# Patient Record
Sex: FEMALE | Race: WHITE | NOT HISPANIC OR LATINO | Employment: FULL TIME | URBAN - NONMETROPOLITAN AREA
[De-identification: names, ages, dates, MRNs, and addresses within clinical notes are randomized per-mention and may not be internally consistent; named-entity substitution may affect disease eponyms.]

---

## 2018-11-29 ENCOUNTER — TRANSCRIBE ORDERS (OUTPATIENT)
Dept: MAMMOGRAPHY | Facility: HOSPITAL | Age: 68
End: 2018-11-29

## 2018-11-29 DIAGNOSIS — Z12.39 BREAST CANCER SCREENING: Primary | ICD-10-CM

## 2019-01-16 ENCOUNTER — APPOINTMENT (OUTPATIENT)
Dept: MAMMOGRAPHY | Facility: HOSPITAL | Age: 69
End: 2019-01-16
Attending: INTERNAL MEDICINE

## 2019-01-30 ENCOUNTER — HOSPITAL ENCOUNTER (OUTPATIENT)
Dept: MAMMOGRAPHY | Facility: HOSPITAL | Age: 69
Discharge: HOME | End: 2019-01-30
Attending: INTERNAL MEDICINE | Admitting: INTERNAL MEDICINE

## 2019-01-30 DIAGNOSIS — Z12.39 BREAST CANCER SCREENING: ICD-10-CM

## 2019-01-30 PROCEDURE — 77067 SCR MAMMO BI INCL CAD: CPT

## 2019-01-30 PROCEDURE — 77063 BREAST TOMOSYNTHESIS BI: CPT

## 2020-02-13 ENCOUNTER — OFFICE VISIT (OUTPATIENT)
Dept: OBSTETRICS AND GYNECOLOGY | Facility: CLINIC | Age: 70
End: 2020-02-13

## 2020-02-13 VITALS
DIASTOLIC BLOOD PRESSURE: 78 MMHG | BODY MASS INDEX: 28.89 KG/M2 | WEIGHT: 157 LBS | SYSTOLIC BLOOD PRESSURE: 122 MMHG | HEIGHT: 62 IN

## 2020-02-13 DIAGNOSIS — Z12.31 ENCOUNTER FOR SCREENING MAMMOGRAM FOR MALIGNANT NEOPLASM OF BREAST: ICD-10-CM

## 2020-02-13 DIAGNOSIS — Z01.419 WELL WOMAN EXAM WITH ROUTINE GYNECOLOGICAL EXAM: Primary | ICD-10-CM

## 2020-02-13 DIAGNOSIS — Z12.39 SCREENING FOR MALIGNANT NEOPLASM OF BREAST: ICD-10-CM

## 2020-02-13 DIAGNOSIS — Z79.890 HORMONE REPLACEMENT THERAPY (HRT): ICD-10-CM

## 2020-02-13 PROCEDURE — G0101 CA SCREEN;PELVIC/BREAST EXAM: HCPCS | Performed by: OBSTETRICS & GYNECOLOGY

## 2020-02-13 RX ORDER — ESTRADIOL 0.04 MG/D
1 PATCH TRANSDERMAL WEEKLY
Qty: 8 PATCH | Refills: 6 | Status: SHIPPED | OUTPATIENT
Start: 2020-02-13

## 2020-02-13 RX ORDER — LEVOTHYROXINE SODIUM 0.07 MG/1
TABLET ORAL
COMMUNITY

## 2020-02-18 NOTE — PROGRESS NOTES
Subjective   Chief Complaint   Patient presents with   • Menopause     Hot flashes, very emotional, mood swings.     Brenda Mtz is a 70 y.o. year old  presenting to be seen for an annual well woman visit.    She stopped HRT roughly 4 weeks ago.  She has severe hot flashes and night sweats.  She reports mood instability.  She would like to restart HRT.  She underwent a hysterectomy in  and has been on estrogen since that time.    She denies sexual dysfunction. She denies urinary complaints.    OB Hx: 2 term C-sections  Contraception: Not applicable  Pap smear: Unsure, denies abnormals  Mammogram: 2019, denies abnormals  Colonoscopy: unsure  DEXA Scan: unsure    She denies nausea, emesis, fevers, chills, significant weight changes, hair/skin/nail changes, dysuria, urinary frequency, palpitations, chest pain, headaches, myalgia, dyspnea.     History  History reviewed. No pertinent past medical history., Past Surgical History:   Procedure Laterality Date   •  SECTION     • OOPHORECTOMY     • TOTAL ABDOMINAL HYSTERECTOMY WITH SALPINGO OOPHORECTOMY     , History reviewed. No pertinent family history., Social History     Tobacco Use   • Smoking status: Never Smoker   • Smokeless tobacco: Never Used   Substance Use Topics   • Alcohol use: Never     Frequency: Never   • Drug use: Never   ,   (Not in a hospital admission) and Allergies:  Azithromycin; Losartan; Levofloxacin; Lisinopril; Red dye; Sulfa antibiotics; Sulfamethoxazole-trimethoprim; and Penicillins    Current Outpatient Medications on File Prior to Visit   Medication Sig Dispense Refill   • levothyroxine (SYNTHROID, LEVOTHROID) 75 MCG tablet levothyroxine 75 mcg tablet       No current facility-administered medications on file prior to visit.        Social History    Tobacco Use      Smoking status: Never Smoker      Smokeless tobacco: Never Used      Review of Systems  Pertinent items are noted in HPI, all other systems were reviewed and  "negative       Objective   /78   Ht 156.2 cm (61.5\")   Wt 71.2 kg (157 lb)   BMI 29.18 kg/m²     Physical Exam:  General Appearance: alert, pleasant, appears stated age, interactive and cooperative  Head: normocephalic, without obvious abnormality and atraumatic  Eyes: lids and lashes normal and no icterus  Ears: ears appear intact with no abnormalities noted  Nose: nares normal, septum midline, mucosa normal and no drainage  Neck: suppple, trachea midline and no thyromegaly  Back: no kyphosis present, no scoliosis present and range of motion normal  Lungs: respirations regular, respirations even and respirations unlabored, clear to auscultation bilaterally   Heart: regular rhythm and normal rate, normal S1, S2, no murmur, gallop, or rubs and no click  Breasts: Examined in supine position  Symmetric without masses or skin dimpling  Nipples normal without inversion, lesions or discharge  There are no palpable axillary nodes  Abdomen: no masses, no hepatomegaly, no splenomegaly, soft non-tender, no guarding and no rebound tenderness  Extremities: moves extremities well, no edema, no cyanosis and no redness  Skin: no bleeding, bruising or rash and no lesions noted  Lymph Nodes: no palpable adenopathy  Neurologic: Cranial Nerves cranial nerves 2 - 12 grossly intact, Speech normal content and execusion, Coordination normal  Psych: normal mood and affect, oriented to person, time and place, thought content organized and appropriate judgment    Pelvis:  Pelvic: Clinical staff was present for exam  External genitalia:  normal appearance of the external genitalia including Bartholin's and [place]'s glands.  :  urethral meatus normal;  Vagina:  atrophic mucosal changes are present;  Cervix:  absent.  Uterus:  absent.  Adnexa:  normal bimanual exam of the adnexa.  Rectal:  digital rectal exam not performed; anus visually normal appearing.       Assessment   Annual well woman exam with age appropriate " screening  Menopausal symptoms  Hormone replacement therapy     Plan    Orders Placed This Encounter   Procedures   • Mammo Screening Digital Tomosynthesis Bilateral With CAD     Standing Status:   Future     Standing Expiration Date:   8/11/2020     Order Specific Question:   Reason for Exam:     Answer:   SCREEN     Medications ordered: Climara patches    Procedures performed: none    - Mammogram: Ordered  - Pap screening guidelines reviewed; pap smear not indicated  - Yearly clinical breast and pelvic exams recommended regardless of pap recommendations  - Dexa scan: not indicated   - Colonoscopy: not indicated   - Healthy diet and exercise encouraged  - Calcium and Vitamin D requirements reviewed  - Contraception: N/A  - Screening: none  - Seat belt use encouraged  - We reviewed options to address her menopausal symptoms.  We discussed hormonal and nonhormonal therapies.  She would like to resume estrogen therapy.  Rx for Climara patches today.  Instructions reviewed.    Follow up for annual visits    Anish Carrillo MD  Obstetrics and Gynecology  [facility]

## 2020-12-02 ENCOUNTER — LAB (OUTPATIENT)
Dept: LAB | Facility: HOSPITAL | Age: 70
End: 2020-12-02

## 2020-12-02 ENCOUNTER — TRANSCRIBE ORDERS (OUTPATIENT)
Dept: LAB | Facility: HOSPITAL | Age: 70
End: 2020-12-02

## 2020-12-02 DIAGNOSIS — Z11.59 SPECIAL SCREENING EXAMINATION FOR UNSPECIFIED VIRAL DISEASE: ICD-10-CM

## 2020-12-02 DIAGNOSIS — Z11.59 SPECIAL SCREENING EXAMINATION FOR UNSPECIFIED VIRAL DISEASE: Primary | ICD-10-CM

## 2020-12-02 PROCEDURE — C9803 HOPD COVID-19 SPEC COLLECT: HCPCS

## 2020-12-02 PROCEDURE — U0004 COV-19 TEST NON-CDC HGH THRU: HCPCS

## 2020-12-03 LAB — SARS-COV-2 RNA RESP QL NAA+PROBE: NOT DETECTED

## 2022-07-12 ENCOUNTER — TRANSCRIBE ORDERS (OUTPATIENT)
Dept: ADMINISTRATIVE | Facility: HOSPITAL | Age: 72
End: 2022-07-12

## 2022-07-12 DIAGNOSIS — Z12.31 VISIT FOR SCREENING MAMMOGRAM: Primary | ICD-10-CM

## 2022-08-19 ENCOUNTER — HOSPITAL ENCOUNTER (OUTPATIENT)
Dept: MAMMOGRAPHY | Facility: HOSPITAL | Age: 72
Discharge: HOME | End: 2022-08-19
Admitting: NURSE PRACTITIONER

## 2022-08-19 DIAGNOSIS — Z12.31 VISIT FOR SCREENING MAMMOGRAM: ICD-10-CM

## 2022-08-19 PROCEDURE — 77063 BREAST TOMOSYNTHESIS BI: CPT

## 2022-08-19 PROCEDURE — 77067 SCR MAMMO BI INCL CAD: CPT

## 2022-09-27 ENCOUNTER — TRANSCRIBE ORDERS (OUTPATIENT)
Dept: ADMINISTRATIVE | Facility: HOSPITAL | Age: 72
End: 2022-09-27

## 2022-09-27 DIAGNOSIS — R92.8 ABNORMAL MAMMOGRAM: ICD-10-CM

## 2022-09-27 DIAGNOSIS — N63.0 BREAST NODULE: Primary | ICD-10-CM

## 2022-11-17 ENCOUNTER — HOSPITAL ENCOUNTER (OUTPATIENT)
Dept: MAMMOGRAPHY | Facility: HOSPITAL | Age: 72
Discharge: HOME | End: 2022-11-17

## 2022-11-17 ENCOUNTER — HOSPITAL ENCOUNTER (OUTPATIENT)
Dept: ULTRASOUND IMAGING | Facility: HOSPITAL | Age: 72
Discharge: HOME | End: 2022-11-17

## 2022-11-17 DIAGNOSIS — R92.8 ABNORMAL MAMMOGRAM: ICD-10-CM

## 2022-11-17 PROCEDURE — 76642 ULTRASOUND BREAST LIMITED: CPT

## 2022-11-17 PROCEDURE — G0279 TOMOSYNTHESIS, MAMMO: HCPCS

## 2022-11-17 PROCEDURE — 77065 DX MAMMO INCL CAD UNI: CPT

## 2023-02-17 ENCOUNTER — TRANSCRIBE ORDERS (OUTPATIENT)
Dept: GENERAL RADIOLOGY | Facility: HOSPITAL | Age: 73
End: 2023-02-17
Payer: MEDICARE

## 2023-02-17 ENCOUNTER — HOSPITAL ENCOUNTER (OUTPATIENT)
Dept: GENERAL RADIOLOGY | Facility: HOSPITAL | Age: 73
Discharge: HOME | End: 2023-02-17
Admitting: NURSE PRACTITIONER
Payer: MEDICARE

## 2023-02-17 DIAGNOSIS — S89.91XA INJURY OF KNEE, RIGHT, INITIAL ENCOUNTER: Primary | ICD-10-CM

## 2023-02-17 DIAGNOSIS — S89.91XA INJURY OF KNEE, RIGHT, INITIAL ENCOUNTER: ICD-10-CM

## 2023-02-17 PROCEDURE — 73562 X-RAY EXAM OF KNEE 3: CPT

## 2023-05-25 ENCOUNTER — HOSPITAL ENCOUNTER (OUTPATIENT)
Dept: PHYSICAL THERAPY | Facility: HOSPITAL | Age: 73
Setting detail: THERAPIES SERIES
Discharge: HOME | End: 2023-05-25
Payer: MEDICARE

## 2023-05-25 PROCEDURE — 97110 THERAPEUTIC EXERCISES: CPT

## 2023-05-25 PROCEDURE — 97161 PT EVAL LOW COMPLEX 20 MIN: CPT

## 2023-05-25 ASSESSMENT — PAIN DESCRIPTION - LOCATION: LOCATION: BACK;KNEE

## 2023-05-25 ASSESSMENT — PAIN DESCRIPTION - ORIENTATION: ORIENTATION: RIGHT;POSTERIOR

## 2023-05-25 ASSESSMENT — PAIN SCALES - GENERAL: PAINLEVEL_OUTOF10: 5

## 2023-05-25 ASSESSMENT — PAIN DESCRIPTION - DESCRIPTORS: DESCRIPTORS: TINGLING;PINS AND NEEDLES

## 2023-05-25 ASSESSMENT — PAIN DESCRIPTION - PAIN TYPE: TYPE: ACUTE PAIN;NEUROPATHIC PAIN

## 2023-05-25 NOTE — PROGRESS NOTES
Right Reflexes             Left Quadriceps (L3-4):  Average/Normal  Left Gastrocnemius (S1):  Average/Normal Right Quadriceps (L3-4):  Average/Normal  Right Gastrocnemius (S1):  Average/Normal     Left AROM  Right AROM         AROM LLE (degrees)  L SLR: 0-50  L Hip External Rotation (0-45): 0-29  L Hip Internal Rotation (0-45): 0-24    AROM RLE (degrees)  R SLR: 0-55  R Hip External Rotation (0-45): 0-24  R Hip Internal Rotation (0-45): 0-28     Left PROM  Right PROM       WNL    WNL     Left Strength  Right Strength         Strength LLE  L Hip Flexion: 4+/5  L Hip Extension: 3+/5  L Hip ABduction: 3+/5  L Hip ADduction: 4/5  L Hip Internal Rotation: 4-/5  L Hip External Rotation: 4-/5  L Knee Flexion: 4+/5  L Knee Extension: 5/5  L Ankle Dorsiflexion: 5/5    Strength RLE  R Hip Flexion: 4/5  R Hip Extension: 3+/5  R Hip ABduction: 3-/5  R Hip ADduction: 4+/5  R Hip Internal Rotation: 4/5  R Hip External Rotation: 3+/5  R Knee Flexion: 3+/5  R Knee Extension: 5/5  R Ankle Dorsiflexion: 4+/5     Cervical Assessment    FHP         Thoracic Assessment     Kyphosis        Lumbar Assessment   AROM Lumbar Spine   Lumbar Spine AROM : Painful  Measured as: Degrees  Flexion: 0-60 5/10  Extension: 0-25 5/10  Lateral Flexion Right: 0-30 5/10  Lateral Flexion Left: 0-25 2/10  Right Rotation: 5/10  Left Rotation: 5/10       Trunk Strength      Poor TA recruitment      Muscle Length/Flexibility: Muscle Length LE  General Muscle Length - LE: Length assessed  90/90 SLR (Hamstring Tightness): R  L     Joint Mobility (if applicable):    Hypomobile lumbar segments     Special Tests:   Special Tests for Hip  Hip Special Tests Performed: Performed  Trendelenburg Sign: R (+), L (+)  Piriformis Tightness: R (+), L (+)  JOHN PAUL Test: L (+), R (+)  Anam Test: L (+), R (+)  Scour (OA, Labrum): R (+), L (+)      Additional Finding(s) (if applicable):    Back Index 56%  LEFS 28/80       ASSESSMENT     Impression: Assessment: Pt is a

## 2023-05-30 ENCOUNTER — HOSPITAL ENCOUNTER (OUTPATIENT)
Dept: PHYSICAL THERAPY | Facility: HOSPITAL | Age: 73
Setting detail: THERAPIES SERIES
Discharge: HOME | End: 2023-05-30
Payer: MEDICARE

## 2023-05-30 PROCEDURE — 97110 THERAPEUTIC EXERCISES: CPT

## 2023-05-30 PROCEDURE — 97140 MANUAL THERAPY 1/> REGIONS: CPT

## 2023-05-30 NOTE — FLOWSHEET NOTE
Physical Therapy Daily Treatment Note   Date:  2023    TIme In: 1346                    Time Out: [address]    Patient Name:  Miranda Pro    :  1950  MRN: [id number]    Restrictions/Precautions:    Pertinent Medical History:  Medical/Treatment Diagnosis Information:  Sciatica, right side [J44.83]     Insurance/Certification information:  Payor: [facility] MEDICARE / Plan: Vielka Diaz ESSENTIAL/PLUS / Product Type: *No Product type* /   Physician Information:  Taniya Gandara CNP  Plan of care signed (Y/N):    Visit# / total visits:     2 /    G-Code (if applicable):      Date / Visit # G-Code Applied:         Progress Note: []  Yes  [x]  No  Next due by: Visit #10       Pain level: 3/10    Subjective: Pt states she was really sore since last tx, she also reports taking some medications this morning that has helped pt her pain. She also states she did not complete her exercises this morning. Objective:  Observation:   Test measurements:    Palpation:    Exercises:  Exercise Resistance/Repetitions Date performed   Nustep lvl 6x6' 30   Seated ER/IR X20 B GTB  Next time   HS Curls  3x10 GTB R  Next time   Bridges  3x10 30   Abd press into ball  2x8 2\" 27   SKTC 3x30\" B 30   Piriformis 3x30\" B 30   Figure 4  3x30\" B 30   Sciatic nerve glide  3x10 R 30   Clamshells 3x10 2\" GTB B 30                         Other Therapeutic Activities:      Manual Treatments: SAD, STM to R lateral hip, IT band and piriformis x15'    Modalities:  MH R lateral hip x12'       Timed Code Treatment Minutes:  47      Total Treatment Minutes:  60    Treatment/Activity Tolerance:  [x] Patient tolerated treatment well [] Patient limited by fatigue  [] Patient limited by pain  [] Patient limited by other medical complications  [x] Other: Pt progressed through exercises with min cues for proper glute activation, pt extremely tender along R IT band and piriformis muscle.  Decreased pain post manual.     Pain after

## 2023-06-01 ENCOUNTER — HOSPITAL ENCOUNTER (OUTPATIENT)
Dept: PHYSICAL THERAPY | Facility: HOSPITAL | Age: 73
Setting detail: THERAPIES SERIES
Discharge: HOME | End: 2023-06-01
Payer: MEDICARE

## 2023-06-01 PROCEDURE — 97140 MANUAL THERAPY 1/> REGIONS: CPT

## 2023-06-01 PROCEDURE — 97110 THERAPEUTIC EXERCISES: CPT

## 2023-06-01 NOTE — FLOWSHEET NOTE
Physical Therapy Daily Treatment Note   Date:  2023    TIme In: 1359                    Time Out: 1510    Patient Name:  Kiarra Duenas    :  1950  MRN: [id number]    Restrictions/Precautions:    Pertinent Medical History:  Medical/Treatment Diagnosis Information:  Sciatica, right side [Z43.18]     Insurance/Certification information:  Payor: [facility] MEDICARE / Plan: Estefany Catadrianne ESSENTIAL/PLUS / Product Type: *No Product type* /   Physician Information:  Sheryle Eland, CNP  Plan of care signed (Y/N):    Visit# / total visits:     3 /    G-Code (if applicable):      Date / Visit # G-Code Applied:         Progress Note: []  Yes  [x]  No  Next due by: Visit #10       Pain level: 5-6/10    Subjective: Pt states she hurt herself yesterday after lifting her  that fell onto the floor. Objective:  Observation:   Test measurements:    Palpation:    Exercises:  Exercise Resistance/Repetitions Date performed   Nustep lvl 6x6' 1   Seated ER/IR X20 B GTB  1   HS Curls  3x10 GTB R  1   Bridges  3x10 1   Abd press into ball  2x8 2\" 1   SKTC 3x30\" B 1   Piriformis 3x30\" B 1   Figure 4  3x30\" B 1   Sciatic nerve glide  3x10 R 1   Clamshells 3x10 2\" GTB B 1                         Other Therapeutic Activities:  Portion of Ther-ex observed by Vi Pearce PT. Manual Treatments: SAD, STM to R lateral hip, IT band and piriformis x15'    Modalities:  MH R lateral hip x12'       Timed Code Treatment Minutes:  57      Total Treatment Minutes:  71    Treatment/Activity Tolerance:  [x] Patient tolerated treatment well [] Patient limited by fatigue  [] Patient limited by pain  [] Patient limited by other medical complications  [x] Other: Pt progressed through exercises with min cues for proper form. Substantial improvement noted with decreased tenderness along IT band. Pt able to complete tx with no pain.     Pain after treatment:0/10    GOALS   Patient Goal(s):    Short Term Goals Completed by 4 weeks

## 2023-06-05 ENCOUNTER — HOSPITAL ENCOUNTER (OUTPATIENT)
Dept: PHYSICAL THERAPY | Facility: HOSPITAL | Age: 73
Setting detail: THERAPIES SERIES
Discharge: HOME | End: 2023-06-05
Payer: MEDICARE

## 2023-06-05 PROCEDURE — 97140 MANUAL THERAPY 1/> REGIONS: CPT

## 2023-06-05 PROCEDURE — 97110 THERAPEUTIC EXERCISES: CPT

## 2023-06-05 NOTE — FLOWSHEET NOTE
Physical Therapy Daily Treatment Note   Date:  2023    TIme In: 1335                    Time Out: 1430    Patient Name:  Dafne Schultz    :  1950  MRN: [id number]    Restrictions/Precautions:    Pertinent Medical History:  Medical/Treatment Diagnosis Information:  Sciatica, right side [K37.75]     Insurance/Certification information:  Payor: [facility] MEDICARE / Plan: Deadra Mast ESSENTIAL/PLUS / Product Type: *No Product type* /   Physician Information:  Jojo Lucero CNP  Plan of care signed (Y/N):    Visit# / total visits:     4 /    G-Code (if applicable):      Date / Visit # G-Code Applied:         Progress Note: []  Yes  [x]  No  Next due by: Visit #10       Pain level: 2/10    Subjective: Pt states she can tell she is doing a lot better since IE. She reports some increase in pain during the weekend, but having relief after performing her HEP. Objective:  Observation:   Test measurements:    Palpation:    Exercises:  Exercise Resistance/Repetitions Date performed   Nustep lvl 6x6' 5   Seated ER/IR X20 B GTB  5   HS Curls  3x10 GTB R  5   Bridges  3x10 5   Abd press into ball  2x8 2\" 5   SKTC 3x30\" B 5   Piriformis 3x30\" B 5   Figure 4  3x30\" B 5   Sciatic nerve glide  3x10 R 5   Clamshells 3x10 2\" GTB B 5   BLE hip extension X15 B Next time   UTR  X10 B Next time               Other Therapeutic Activities:     Manual Treatments: SAD, STM to R lateral hip, IT band and piriformis x15'    Modalities:  MH R lateral hip x12'       Timed Code Treatment Minutes:  42      Total Treatment Minutes:  55    Treatment/Activity Tolerance:  [x] Patient tolerated treatment well [] Patient limited by fatigue  [] Patient limited by pain  [] Patient limited by other medical complications  [x] Other: Pt progressed through exercises with increased speed and minimal/no rest breaks required. Pt substantially less tender around IT band since previous visits. No pain post tx.      Pain after

## 2023-06-07 ENCOUNTER — HOSPITAL ENCOUNTER (OUTPATIENT)
Dept: PHYSICAL THERAPY | Facility: HOSPITAL | Age: 73
Setting detail: THERAPIES SERIES
Discharge: HOME | End: 2023-06-07
Payer: MEDICARE

## 2023-06-07 PROCEDURE — 97140 MANUAL THERAPY 1/> REGIONS: CPT

## 2023-06-07 PROCEDURE — 97110 THERAPEUTIC EXERCISES: CPT

## 2023-06-07 NOTE — FLOWSHEET NOTE
Physical Therapy Daily Treatment Note   Date:  2023    TIme In: 1726                    Time Out: 1103    Patient Name:  Sukhjinder William    :  1950  MRN: [id number]    Restrictions/Precautions:    Pertinent Medical History:  Medical/Treatment Diagnosis Information:  Sciatica, right side [X12.24]     Insurance/Certification information:  Payor: [place] / Plan: Kristie Lee ESSENTIAL/PLUS / Product Type: *No Product type* /   Physician Information:  Magali Torre CNP  Plan of care signed (Y/N):    Visit# / total visits:     5 /    G-Code (if applicable):      Date / Visit # G-Code Applied:         Progress Note: []  Yes  [x]  No  Next due by: Visit #10       Pain level: 2-3/10    Subjective: Pt states her  fell out of bed yesterday morning and she had to help him off the floor, so he hip and back is hurting some today. Objective:  Observation:   Test measurements:    Palpation:    Exercises:  Exercise Resistance/Repetitions Date performed   Nustep lvl 6x6' 7   Seated ER/IR X20 B GTB  7   HS Curls  3x10 GTB R  7   Bridges  3x10 7   Abd press into ball  2x8 2\" 7   SKTC 3x30\" B 7   Piriformis 3x30\" B 7   Figure 4  3x30\" B 7   Sciatic nerve glide  3x10 R 7   Clamshells 3x10 2\" GTB B 7   BLE hip extension X15 B Standing 7 Prone next time   UTR  X10 B 7               Other Therapeutic Activities:     Manual Treatments: SAD, STM to R lateral hip, IT band and piriformis x15'    Modalities:  MH R lateral hip x12'       Timed Code Treatment Minutes:  52      Total Treatment Minutes:  65    Treatment/Activity Tolerance:  [x] Patient tolerated treatment well [] Patient limited by fatigue  [] Patient limited by pain  [] Patient limited by other medical complications  [x] Other: Pt progressed through exercises, pt tolerated additional exercises with min cues for proper form. Pt continues to demonstrate decreased tenderness around IT band during manual. Increased relief noted post tx.      Pain

## 2023-06-14 ENCOUNTER — APPOINTMENT (OUTPATIENT)
Dept: PHYSICAL THERAPY | Facility: HOSPITAL | Age: 73
End: 2023-06-14
Payer: MEDICARE

## 2023-06-20 ENCOUNTER — HOSPITAL ENCOUNTER (OUTPATIENT)
Dept: PHYSICAL THERAPY | Facility: HOSPITAL | Age: 73
Setting detail: THERAPIES SERIES
Discharge: HOME | End: 2023-06-20
Payer: MEDICARE

## 2023-06-20 PROCEDURE — 97110 THERAPEUTIC EXERCISES: CPT

## 2023-06-20 PROCEDURE — 97140 MANUAL THERAPY 1/> REGIONS: CPT

## 2023-06-20 NOTE — FLOWSHEET NOTE
Physical Therapy Daily Treatment Note/Reassessment/D/C  Date:  2023    TIme In: 1332                    Time Out: [address]    Patient Name:  Sarika Queen    :  1950  MRN: [id number]    Restrictions/Precautions:    Pertinent Medical History:  Medical/Treatment Diagnosis Information:  Sciatica, right side [Y54.14]     Insurance/Certification information:  Payor: [facility] MEDICARE / Plan: Florence Bernstein ESSENTIAL/PLUS / Product Type: *No Product type* /   Physician Information:  Kristi Cevallos CNP  Plan of care signed (Y/N):    Visit# / total visits:     7 /    G-Code (if applicable):      Date / Visit # G-Code Applied:         Progress Note: [x]  Yes  []  No  Next due by: 23     Pain level:  0/10    Subjective: Pt reports her symptoms are at least 50% better and she no longer has symptoms going down her leg and no pain with movement of her low back. Objective:  Observation:   Test measurements:  LEFS: 63/80. Back Index: 6%. Hip AROM:  (L): ER: 26 deg, IR: 32 deg. (R): ER: 25 deg, IR: 32 deg. Hip MMT: (L): ext: 5/5, abd: 5/5, add: 5/5, ER: 4+/5, IR: 4+/5. (R): flex: 5/5, ext: 4+/5, abd: 4+/5, ER: 4+/5, IR: 5/5, knee flex: 5/5. Pt reports no pain during ADL's    Palpation:    Exercises:  Exercise Resistance/Repetitions Date performed   Nustep lvl 6x6' 20   Seated ER/IR X20 B GTB  20   HS Curls  3x10 GTB R  20   Bridges  3x10 20   Abd press into ball  2x8 2\" 20   SKTC 3x30\" B 20   Piriformis 3x30\" B 20   Figure 4  3x30\" B 20   Sciatic nerve glide  3x10 R 20   Clamshells 3x10 2\" GTB B 20   Prone BLE hip extension X15 B  20   UTR  X10 B OTB next time 20               Other Therapeutic Activities: Re-assess performed by Saundra Dunn DPT.     Manual Treatments: SAD, STM to R lateral hip, IT band and piriformis x15'    Modalities:  MH R lateral hip x12'       Timed Code Treatment Minutes:  60      Total Treatment Minutes:  75    Treatment/Activity Tolerance:  [x]

## 2023-06-22 ENCOUNTER — APPOINTMENT (OUTPATIENT)
Dept: PHYSICAL THERAPY | Facility: HOSPITAL | Age: 73
End: 2023-06-22
Payer: MEDICARE

## 2023-06-27 ENCOUNTER — APPOINTMENT (OUTPATIENT)
Dept: PHYSICAL THERAPY | Facility: HOSPITAL | Age: 73
End: 2023-06-27
Payer: MEDICARE

## 2023-10-12 ENCOUNTER — TRANSCRIBE ORDERS (OUTPATIENT)
Dept: ADMINISTRATIVE | Facility: HOSPITAL | Age: 73
End: 2023-10-12
Payer: MEDICARE

## 2023-10-12 DIAGNOSIS — E03.9 HYPOTHYROIDISM, UNSPECIFIED TYPE: Primary | ICD-10-CM

## 2023-10-12 DIAGNOSIS — E04.1 NONTOXIC SINGLE THYROID NODULE: ICD-10-CM

## 2023-10-16 ENCOUNTER — TRANSCRIBE ORDERS (OUTPATIENT)
Dept: ADMINISTRATIVE | Facility: HOSPITAL | Age: 73
End: 2023-10-16
Payer: MEDICARE

## 2023-10-16 DIAGNOSIS — Z12.31 VISIT FOR SCREENING MAMMOGRAM: Primary | ICD-10-CM

## 2023-12-04 ENCOUNTER — HOSPITAL ENCOUNTER (OUTPATIENT)
Dept: ULTRASOUND IMAGING | Facility: HOSPITAL | Age: 73
Discharge: HOME | End: 2023-12-04
Admitting: NURSE PRACTITIONER
Payer: MEDICARE

## 2023-12-04 DIAGNOSIS — E03.9 HYPOTHYROIDISM, UNSPECIFIED TYPE: ICD-10-CM

## 2023-12-04 DIAGNOSIS — E04.1 NONTOXIC SINGLE THYROID NODULE: ICD-10-CM

## 2023-12-04 PROCEDURE — 76536 US EXAM OF HEAD AND NECK: CPT
